# Patient Record
Sex: MALE | Race: WHITE | NOT HISPANIC OR LATINO | Employment: UNEMPLOYED | URBAN - METROPOLITAN AREA
[De-identification: names, ages, dates, MRNs, and addresses within clinical notes are randomized per-mention and may not be internally consistent; named-entity substitution may affect disease eponyms.]

---

## 2023-06-12 ENCOUNTER — OFFICE VISIT (OUTPATIENT)
Dept: PEDIATRICS | Facility: CLINIC | Age: 18
End: 2023-06-12
Payer: COMMERCIAL

## 2023-06-12 VITALS — WEIGHT: 128.8 LBS | TEMPERATURE: 98.3 F | BODY MASS INDEX: 21.11 KG/M2

## 2023-06-12 DIAGNOSIS — B34.9 VIRAL SYNDROME: Primary | ICD-10-CM

## 2023-06-12 PROBLEM — R10.31 ACUTE RIGHT LOWER QUADRANT PAIN: Status: RESOLVED | Noted: 2023-06-12 | Resolved: 2023-06-12

## 2023-06-12 PROBLEM — K52.9 GASTROENTERITIS: Status: RESOLVED | Noted: 2023-06-12 | Resolved: 2023-06-12

## 2023-06-12 PROBLEM — U07.1 COVID-19: Status: RESOLVED | Noted: 2023-06-12 | Resolved: 2023-06-12

## 2023-06-12 PROBLEM — B08.4 HAND, FOOT AND MOUTH DISEASE: Status: RESOLVED | Noted: 2023-06-12 | Resolved: 2023-06-12

## 2023-06-12 PROBLEM — A09 TRAVELERS' DIARRHEA: Status: RESOLVED | Noted: 2023-06-12 | Resolved: 2023-06-12

## 2023-06-12 PROBLEM — L01.00 IMPETIGO: Status: RESOLVED | Noted: 2023-06-12 | Resolved: 2023-06-12

## 2023-06-12 PROCEDURE — 99213 OFFICE O/P EST LOW 20 MIN: CPT | Performed by: PEDIATRICS

## 2023-06-12 NOTE — PROGRESS NOTES
Ángel Lopesalkashmir is a 17 y.o. who presents for Nasal Congestion, Chest Pain, Headache, and Generalized Body Aches.      HPI  Ángel has had over a week of rash, intermittent aches, and chills. He has had headache intermittently.  He has not had significant nasal congestion, no facial pressure.  Today he still has some mild sore ness, but he is generally improved.  He is taking Ibuprofen 2 to 4 times per day.  He is leaving for trip to [facility] tomorrow evening.  Objective   Temp 36.8 °C (98.3 °F) (Oral)   Wt 58.4 kg (128 lb 12.8 oz)   BMI 21.11 kg/m²     Physical Exam  Constitutional:       Appearance: Normal appearance.   HENT:      Right Ear: Tympanic membrane normal.      Left Ear: Tympanic membrane normal.      Nose: Nose normal.      Mouth/Throat:      Mouth: Mucous membranes are moist.   Eyes:      Conjunctiva/sclera: Conjunctivae normal.   Neck:      Comments: Neck is supple -- there is some mild pain in the posterior/lateral neck muscle insertions with flexion, otherwise normal.  Cardiovascular:      Rate and Rhythm: Normal rate and regular rhythm.      Heart sounds: Normal heart sounds.   Pulmonary:      Effort: Pulmonary effort is normal.      Breath sounds: Normal breath sounds.   Abdominal:      General: Bowel sounds are normal.      Tenderness: There is no abdominal tenderness.      Comments: No splenomegaly.  Liver palpable about 1 cm BCM   Musculoskeletal:      Cervical back: Normal range of motion and neck supple.   Neurological:      Mental Status: He is alert.         Assessment/Plan   Ángel's symptoms are most consistent with a viral etiology without signs of complications.  In general he is improving today.  We discussed options including a lab evaluation vs. Observation (especially given his trip to [facility] tomorrow) and elected to observe with symptomatic treatment.      Today we discussed a typical course of illness, symptomatic treatment, and signs of worsening/when to seek medical care.

## 2023-06-24 ENCOUNTER — TELEPHONE (OUTPATIENT)
Dept: PEDIATRICS | Facility: CLINIC | Age: 18
End: 2023-06-24
Payer: COMMERCIAL

## 2023-06-24 NOTE — TELEPHONE ENCOUNTER
Ángel called. He has an appt Monday with AN for ongoing body aches and general malaise. He woke up last night with severe body aches. No fever or other symptoms. No rashes. He took some Motrin and fell back to sleep. Feels better this am but still achy. Home care advised.

## 2023-06-26 ENCOUNTER — OFFICE VISIT (OUTPATIENT)
Dept: PEDIATRICS | Facility: CLINIC | Age: 18
End: 2023-06-26
Payer: COMMERCIAL

## 2023-06-26 ENCOUNTER — TELEPHONE (OUTPATIENT)
Dept: PEDIATRICS | Facility: CLINIC | Age: 18
End: 2023-06-26

## 2023-06-26 VITALS
SYSTOLIC BLOOD PRESSURE: 107 MMHG | TEMPERATURE: 99.3 F | DIASTOLIC BLOOD PRESSURE: 55 MMHG | BODY MASS INDEX: 21.66 KG/M2 | WEIGHT: 132.19 LBS | HEART RATE: 108 BPM

## 2023-06-26 DIAGNOSIS — M25.50 ARTHRALGIA, UNSPECIFIED JOINT: ICD-10-CM

## 2023-06-26 DIAGNOSIS — R50.9 FEVER, UNSPECIFIED FEVER CAUSE: Primary | ICD-10-CM

## 2023-06-26 PROCEDURE — 99215 OFFICE O/P EST HI 40 MIN: CPT | Performed by: PEDIATRICS

## 2023-06-26 RX ORDER — CIPROFLOXACIN HYDROCHLORIDE 500 MG/1
TABLET, FILM COATED ORAL 2 TIMES DAILY
COMMUNITY
Start: 2023-06-25 | End: 2023-06-29

## 2023-06-26 NOTE — TELEPHONE ENCOUNTER
I called and spoke with mom with Ángel in the same room as well.  About 1 month ago Ángel had a non-specific illness with cough, hives, rash, and neck stiffness -- this occurred while at college.  This improved, although I saw him 3 weeks ago and since his symptoms were improving, no further evaluation was done at that time.  He continued to have a mild cough, but was otherwise well and spent a week in [place].  Three days ago he developed pain in his neck, shoulders, and back and yesterday developed fever.  He had severe pain and high fever and was taken to [facility] ER.  There, he underwent a sepsis eval -- cbc, chemistries, lactate, Troponin, CXR, and head CT were negative except a minimal increase in his wbc count with a left shift.  Blood cultures are negative.  Repeat HIV testing was negative. He was given IV antibiotics and oral cipro.  Overnight he has remained febrile and has continued pain, although it is now improved with Ibuprofen.  He may have some ankle swelling. He is coming to see me this afternoon.  We discussed indications to return to the ER, otherwise we will re-evaluate this afternoon.

## 2023-06-26 NOTE — TELEPHONE ENCOUNTER
Mom calling- 3 weeks ago he was at school and had a rash with modeled hands, painful neck, fever, came home and was seen here.  Liver was enlarged, thought he was getting over a virus, went to [place]. Came home and Friday night started feeling very bad again, back, shoulders and neck were very painful.  Delirious when he woke up in the middle of the night Friday.  Progressively worsened high fever yesterday and went to ER, the called a sepsis alert in the ER (notes from [facility]).  Discharged home with antibiotics.  This morning he still has a fever and pain seems to be moving all around body.  He is not any better.  Please advise.     [phone]

## 2023-06-27 ENCOUNTER — TELEPHONE (OUTPATIENT)
Dept: PEDIATRICS | Facility: CLINIC | Age: 18
End: 2023-06-27
Payer: COMMERCIAL

## 2023-06-27 ENCOUNTER — LAB (OUTPATIENT)
Dept: LAB | Facility: LAB | Age: 18
End: 2023-06-27
Payer: COMMERCIAL

## 2023-06-27 DIAGNOSIS — R50.9 FEVER, UNSPECIFIED FEVER CAUSE: ICD-10-CM

## 2023-06-27 PROCEDURE — 80053 COMPREHEN METABOLIC PANEL: CPT

## 2023-06-27 PROCEDURE — 85025 COMPLETE CBC W/AUTO DIFF WBC: CPT

## 2023-06-27 PROCEDURE — 86140 C-REACTIVE PROTEIN: CPT

## 2023-06-27 PROCEDURE — 85652 RBC SED RATE AUTOMATED: CPT

## 2023-06-27 PROCEDURE — 36415 COLL VENOUS BLD VENIPUNCTURE: CPT

## 2023-06-27 NOTE — TELEPHONE ENCOUNTER
Mom calling- he has a new symptom, feeling worse, its been 1.5 hrs since his Advil, still has 101.5 fever.  And in the back of his head he feels like someone is stabbing him with a pin. Mom wanted you to know.  Advised mom that if pain becomes intolerable to go to ER.  Coming in for blood work today.

## 2023-06-27 NOTE — TELEPHONE ENCOUNTER
Called and spoke with mom -- will repeat labs today and follow up with me tomorrow.  Continue regular Ibuprofen.  To ER if worse.  Consider ID and/or rheumatology input this week if symptoms persist.

## 2023-06-28 ENCOUNTER — OFFICE VISIT (OUTPATIENT)
Dept: PEDIATRICS | Facility: CLINIC | Age: 18
End: 2023-06-28
Payer: COMMERCIAL

## 2023-06-28 VITALS
SYSTOLIC BLOOD PRESSURE: 115 MMHG | TEMPERATURE: 97 F | DIASTOLIC BLOOD PRESSURE: 70 MMHG | HEART RATE: 72 BPM | WEIGHT: 133 LBS | BODY MASS INDEX: 21.8 KG/M2

## 2023-06-28 DIAGNOSIS — M02.39 REACTIVE ARTHRITIS OF MULTIPLE SITES (MULTI): ICD-10-CM

## 2023-06-28 DIAGNOSIS — R50.9 FEVER, UNSPECIFIED FEVER CAUSE: Primary | ICD-10-CM

## 2023-06-28 DIAGNOSIS — R19.7 DIARRHEA OF PRESUMED INFECTIOUS ORIGIN: ICD-10-CM

## 2023-06-28 LAB
ALANINE AMINOTRANSFERASE (SGPT) (U/L) IN SER/PLAS: 11 U/L (ref 10–52)
ALBUMIN (G/DL) IN SER/PLAS: 3.6 G/DL (ref 3.4–5)
ALKALINE PHOSPHATASE (U/L) IN SER/PLAS: 101 U/L (ref 33–120)
ANION GAP IN SER/PLAS: 13 MMOL/L (ref 10–20)
ASPARTATE AMINOTRANSFERASE (SGOT) (U/L) IN SER/PLAS: 14 U/L (ref 9–39)
BASOPHILS (10*3/UL) IN BLOOD BY AUTOMATED COUNT: 0.04 X10E9/L (ref 0–0.1)
BASOPHILS/100 LEUKOCYTES IN BLOOD BY AUTOMATED COUNT: 0.3 % (ref 0–2)
BILIRUBIN TOTAL (MG/DL) IN SER/PLAS: 0.4 MG/DL (ref 0–1.2)
C REACTIVE PROTEIN (MG/L) IN SER/PLAS: 23.33 MG/DL
CALCIUM (MG/DL) IN SER/PLAS: 9.4 MG/DL (ref 8.6–10.6)
CARBON DIOXIDE, TOTAL (MMOL/L) IN SER/PLAS: 28 MMOL/L (ref 21–32)
CHLORIDE (MMOL/L) IN SER/PLAS: 103 MMOL/L (ref 98–107)
CREATININE (MG/DL) IN SER/PLAS: 0.92 MG/DL (ref 0.5–1.3)
EOSINOPHILS (10*3/UL) IN BLOOD BY AUTOMATED COUNT: 0.75 X10E9/L (ref 0–0.7)
EOSINOPHILS/100 LEUKOCYTES IN BLOOD BY AUTOMATED COUNT: 5.2 % (ref 0–6)
ERYTHROCYTE DISTRIBUTION WIDTH (RATIO) BY AUTOMATED COUNT: 12 % (ref 11.5–14.5)
ERYTHROCYTE MEAN CORPUSCULAR HEMOGLOBIN CONCENTRATION (G/DL) BY AUTOMATED: 32.5 G/DL (ref 32–36)
ERYTHROCYTE MEAN CORPUSCULAR VOLUME (FL) BY AUTOMATED COUNT: 94 FL (ref 80–100)
ERYTHROCYTES (10*6/UL) IN BLOOD BY AUTOMATED COUNT: 4.12 X10E12/L (ref 4.5–5.9)
GFR MALE: >90 ML/MIN/1.73M2
GLUCOSE (MG/DL) IN SER/PLAS: 91 MG/DL (ref 74–99)
HEMATOCRIT (%) IN BLOOD BY AUTOMATED COUNT: 38.8 % (ref 41–52)
HEMOGLOBIN (G/DL) IN BLOOD: 12.6 G/DL (ref 13.5–17.5)
IMMATURE GRANULOCYTES/100 LEUKOCYTES IN BLOOD BY AUTOMATED COUNT: 0.3 % (ref 0–0.9)
LEUKOCYTES (10*3/UL) IN BLOOD BY AUTOMATED COUNT: 14.5 X10E9/L (ref 4.4–11.3)
LYMPHOCYTES (10*3/UL) IN BLOOD BY AUTOMATED COUNT: 1.23 X10E9/L (ref 1.2–4.8)
LYMPHOCYTES/100 LEUKOCYTES IN BLOOD BY AUTOMATED COUNT: 8.5 % (ref 13–44)
MONOCYTES (10*3/UL) IN BLOOD BY AUTOMATED COUNT: 1.2 X10E9/L (ref 0.1–1)
MONOCYTES/100 LEUKOCYTES IN BLOOD BY AUTOMATED COUNT: 8.3 % (ref 2–10)
NEUTROPHILS (10*3/UL) IN BLOOD BY AUTOMATED COUNT: 11.25 X10E9/L (ref 1.2–7.7)
NEUTROPHILS/100 LEUKOCYTES IN BLOOD BY AUTOMATED COUNT: 77.4 % (ref 40–80)
NRBC (PER 100 WBCS) BY AUTOMATED COUNT: 0 /100 WBC (ref 0–0)
PLATELETS (10*3/UL) IN BLOOD AUTOMATED COUNT: 358 X10E9/L (ref 150–450)
POTASSIUM (MMOL/L) IN SER/PLAS: 4.3 MMOL/L (ref 3.5–5.3)
PROTEIN TOTAL: 6.9 G/DL (ref 6.4–8.2)
SEDIMENTATION RATE, ERYTHROCYTE: 69 MM/H (ref 0–15)
SODIUM (MMOL/L) IN SER/PLAS: 140 MMOL/L (ref 136–145)
UREA NITROGEN (MG/DL) IN SER/PLAS: 12 MG/DL (ref 6–23)

## 2023-06-28 PROCEDURE — 99215 OFFICE O/P EST HI 40 MIN: CPT | Performed by: PEDIATRICS

## 2023-06-28 RX ORDER — NAPROXEN 500 MG/1
500 TABLET ORAL 2 TIMES DAILY
Qty: 20 TABLET | Refills: 1 | Status: SHIPPED | OUTPATIENT
Start: 2023-06-28 | End: 2023-07-08

## 2023-06-28 NOTE — PROGRESS NOTES
Ángel Lopesalkashmir is a 17 y.o. who presents for Joint Swelling.  Today he is accompanied by mother who provided history.    HPI  He is here today for follow up of his acute febrile illness with arthralgias and arthritis.  He has now had 5 days of symptoms. Yesterday he had repeat labs that showed very significant elevations of his CRP and ESR and well as mild anemia without other lab abnormalities of significance.  He has continued to have pain, although it is improved.  He has now had diarrhea 4 to 5 times per day.  No blood in the stool.  He is still on cipro.    Ángel is sexually active with one male partner, although last sexual encounter was a month ago.  HIV testing was negative at the ER several days ago.    Objective   Temp 36.1 °C (97 °F) (Oral)   Wt 60.3 kg (133 lb)   BMI 21.80 kg/m²     Physical Exam  Constitutional:       Appearance: Normal appearance.   HENT:      Right Ear: Tympanic membrane normal.      Left Ear: Tympanic membrane normal.      Nose: Nose normal.      Mouth/Throat:      Mouth: Mucous membranes are moist.   Eyes:      Conjunctiva/sclera: Conjunctivae normal.   Cardiovascular:      Rate and Rhythm: Normal rate and regular rhythm.      Heart sounds: Normal heart sounds.   Pulmonary:      Effort: Pulmonary effort is normal.      Breath sounds: Normal breath sounds.   Abdominal:      General: Bowel sounds are normal.      Tenderness: There is no abdominal tenderness.   Musculoskeletal:      Cervical back: Normal range of motion and neck supple.   Neurological:      Mental Status: He is alert.         Assessment/Plan   Ángel's acute illness is most likely a reactive arthritis, although the triggering etiology is not yet identified.  He clearly does not have concerns for meningococcus, so Cipro was stopped.  He will change to Naproxen 500mg bid for the next 7 days.  Repeat labs were ordered tomorrow, including some studies to look for etiologies including:  Stool pcr  Mycoplasma, Parvo, EBV, CMV,  GC/Chlamydia in urine.  Stop richie Neal or his mother will call sooner with any concerns, otherwise we will plan phone follow up when results are available.

## 2023-07-03 ENCOUNTER — OFFICE VISIT (OUTPATIENT)
Dept: PEDIATRICS | Facility: CLINIC | Age: 18
End: 2023-07-03
Payer: COMMERCIAL

## 2023-07-03 ENCOUNTER — PATIENT OUTREACH (OUTPATIENT)
Dept: CARE COORDINATION | Facility: CLINIC | Age: 18
End: 2023-07-03

## 2023-07-03 VITALS — WEIGHT: 127.06 LBS | BODY MASS INDEX: 20.82 KG/M2 | TEMPERATURE: 98.8 F

## 2023-07-03 DIAGNOSIS — R50.9 FEVER, UNSPECIFIED FEVER CAUSE: Primary | ICD-10-CM

## 2023-07-03 DIAGNOSIS — M19.90 ARTHRITIS: ICD-10-CM

## 2023-07-03 PROCEDURE — 99215 OFFICE O/P EST HI 40 MIN: CPT | Performed by: PEDIATRICS

## 2023-07-03 SDOH — SOCIAL STABILITY: SOCIAL NETWORK: ARE YOU MARRIED, WIDOWED, DIVORCED, SEPARATED, NEVER MARRIED, OR LIVING WITH A PARTNER?: NEVER MARRIED

## 2023-07-03 SDOH — ECONOMIC STABILITY: TRANSPORTATION INSECURITY
IN THE PAST 12 MONTHS, HAS THE LACK OF TRANSPORTATION KEPT YOU FROM MEDICAL APPOINTMENTS OR FROM GETTING MEDICATIONS?: NO

## 2023-07-03 SDOH — ECONOMIC STABILITY: GENERAL: WOULD YOU LIKE HELP WITH ANY OF THE FOLLOWING NEEDS?: I DONT NEED HELP WITH ANY OF THESE

## 2023-07-03 NOTE — PROGRESS NOTES
Directed to [facility] from pediatrician office for fever and elevated CRP, admitted 6/28/23 to 6/30/23.  Recent travel to [place].   Unknown etiology of fever.   Followed up with pediatrician today with labs pending.      Engagement  Call Start Time: 1213 (7/3/2023 12:12 PM)    Medications  Medications reviewed with patient/caregiver?: No (7/3/2023 12:12 PM)  Care Management Interventions: No intervention needed (7/3/2023 12:12 PM)    Appointments  Does the patient have a primary care provider?: Yes (Saw pediatrician 7/3) (7/3/2023 12:12 PM)  Has the patient kept scheduled appointments due by today?: Yes (7/3/2023 12:12 PM)    Patient Teaching  What is the patient's perception of their health status since discharge?: Improving (7/3/2023 12:12 PM)    Wrap Up  Call End Time: 1214 (7/3/2023 12:12 PM)        Outreach to patient for completion of transition of care.  Enrolled in [place] chat for 30 transition period and will outreach if issues arise.    Aury FRANCOIS RN CCM  RN Care Coordinator  [facility]  Phone [phone]

## 2023-07-03 NOTE — PROGRESS NOTES
Ángel Barkley is a 17 y.o. who presents for Follow-up.  Today he is accompanied by mother who provided history.    ALANNAH Neal is here for a hospital follow up.  He was admitted to [facility] 5 days ago and stayed for 2 days for evaluation of fever, arthralgias, and arthritis of uncertain etiology.  He was tested for multiple different causes and no definitive diagnosis was made.  He was seen by ID, GI, and oncology while there.  Lyme, Brucellosis, and fecal calprotectin were still pending at discharge.    He has been afebrile for the last 2 days and his pain is still significant, but improved from last week.  He continues on alternating Ibuprofen and Acetaminophen.  He is eating and drinking fairly well, although he has lost 5lbs during this illness.    He has no other new symptoms.    Ángel's hospital records were extensively reviewed including results of all lab tests.    He is scheduled to follow up for an endoscopy with GI in the coming weeks as well as ID.  Oncology requested repeat labs in 4 to 6 weeks.      Objective   Temp 37.1 °C (98.8 °F)   Wt 57.6 kg (127 lb 1 oz)   BMI 20.82 kg/m²     Physical Exam  Constitutional:       Appearance: Normal appearance.   HENT:      Right Ear: Tympanic membrane normal.      Left Ear: Tympanic membrane normal.      Nose: Nose normal.      Mouth/Throat:      Mouth: Mucous membranes are moist.   Eyes:      Conjunctiva/sclera: Conjunctivae normal.   Cardiovascular:      Rate and Rhythm: Normal rate and regular rhythm.      Heart sounds: Normal heart sounds.   Pulmonary:      Effort: Pulmonary effort is normal.      Breath sounds: Normal breath sounds.   Abdominal:      General: Bowel sounds are normal.      Tenderness: There is no abdominal tenderness.   Musculoskeletal:      Cervical back: Normal range of motion and neck supple.   Lymphadenopathy:      Comments: No cervical, axillary, supraclavicular adenopathy noted   Neurological:      Mental Status: He is alert.          Assessment/Plan   Ángel has fever and arthralgias/arthritis of uncertain etiology after an extensive evaluation including inpatient hospital admission.  All of his labs were reviewed.  Plan:    Continue Ibuprofen/Acetaminophen  Follow up with Rheum/ID this week  I will contact Dr. Pleitez from ID to discuss other etiologies/labs to consider  Call me at any time if worse.

## 2023-07-06 DIAGNOSIS — R50.9 FEVER, UNSPECIFIED FEVER CAUSE: Primary | ICD-10-CM

## 2023-07-06 NOTE — PROGRESS NOTES
Called and spoke with mom -- Ángel is now afebrile, but continues to have significant generalized pain that is improved with Ibuprofen.  He was seen by Dr. Pleitez from ID and Dr. Villa from rheumatology -- multiple labs were ordered looking at rheum causes and his inflammatory markers will be repeated.  I added Bartonella and Parvovirus titers as well.  I agreed with Ángel seeing Dr. Luong for immunology as well.

## 2023-07-10 ENCOUNTER — APPOINTMENT (OUTPATIENT)
Dept: LAB | Facility: LAB | Age: 18
End: 2023-07-10
Payer: COMMERCIAL

## 2023-07-10 LAB
ALANINE AMINOTRANSFERASE (SGPT) (U/L) IN SER/PLAS: 32 U/L (ref 10–52)
ALBUMIN (G/DL) IN SER/PLAS: 4 G/DL (ref 3.4–5)
ALKALINE PHOSPHATASE (U/L) IN SER/PLAS: 95 U/L (ref 33–120)
ANION GAP IN SER/PLAS: 15 MMOL/L (ref 10–20)
APPEARANCE, URINE: CLEAR
ASPARTATE AMINOTRANSFERASE (SGOT) (U/L) IN SER/PLAS: 17 U/L (ref 9–39)
BASOPHILS (10*3/UL) IN BLOOD BY AUTOMATED COUNT: 0.05 X10E9/L (ref 0–0.1)
BASOPHILS (10*3/UL) IN BLOOD BY AUTOMATED COUNT: NORMAL
BASOPHILS (10*3/UL) IN BLOOD BY AUTOMATED COUNT: NORMAL
BASOPHILS/100 LEUKOCYTES IN BLOOD BY AUTOMATED COUNT: 0.4 % (ref 0–2)
BASOPHILS/100 LEUKOCYTES IN BLOOD BY AUTOMATED COUNT: NORMAL
BASOPHILS/100 LEUKOCYTES IN BLOOD BY AUTOMATED COUNT: NORMAL
BILIRUBIN TOTAL (MG/DL) IN SER/PLAS: 0.4 MG/DL (ref 0–1.2)
BILIRUBIN, URINE: NEGATIVE
BLOOD, URINE: NEGATIVE
C REACTIVE PROTEIN (MG/L) IN SER/PLAS: 12.15 MG/DL
CALCIUM (MG/DL) IN RANDOM URINE: 6.9 MG/DL
CALCIUM (MG/DL) IN SER/PLAS: 10 MG/DL (ref 8.6–10.6)
CALCIUM/CREATINE (MG/G) IN URINE: 141 MG/G CREAT (ref 0–209)
CARBON DIOXIDE, TOTAL (MMOL/L) IN SER/PLAS: 30 MMOL/L (ref 21–32)
CHLORIDE (MMOL/L) IN SER/PLAS: 99 MMOL/L (ref 98–107)
CITRULLINE ANTIBODY, IGG: <1 U/ML
COLOR, URINE: NORMAL
COMPLEMENT C3 (MG/DL) IN SER/PLAS: 185 MG/DL (ref 87–200)
COMPLEMENT C4 (MG/DL) IN SER/PLAS: 43 MG/DL (ref 10–50)
CREATINE KINASE (U/L) IN SER/PLAS: 15 U/L (ref 0–325)
CREATININE (MG/DL) IN SER/PLAS: 0.92 MG/DL (ref 0.5–1.3)
CREATININE (MG/DL) IN URINE: 48.9 MG/DL (ref 20–370)
CREATININE (MG/DL) IN URINE: 48.9 MG/DL (ref 20–370)
EOSINOPHILS (10*3/UL) IN BLOOD BY AUTOMATED COUNT: 0.33 X10E9/L (ref 0–0.7)
EOSINOPHILS (10*3/UL) IN BLOOD BY AUTOMATED COUNT: NORMAL
EOSINOPHILS (10*3/UL) IN BLOOD BY AUTOMATED COUNT: NORMAL
EOSINOPHILS/100 LEUKOCYTES IN BLOOD BY AUTOMATED COUNT: 2.5 % (ref 0–6)
EOSINOPHILS/100 LEUKOCYTES IN BLOOD BY AUTOMATED COUNT: NORMAL
EOSINOPHILS/100 LEUKOCYTES IN BLOOD BY AUTOMATED COUNT: NORMAL
ERYTHROCYTE DISTRIBUTION WIDTH (RATIO) BY AUTOMATED COUNT: 11.9 % (ref 11.5–14.5)
ERYTHROCYTE DISTRIBUTION WIDTH (RATIO) BY AUTOMATED COUNT: NORMAL
ERYTHROCYTE DISTRIBUTION WIDTH (RATIO) BY AUTOMATED COUNT: NORMAL
ERYTHROCYTE MEAN CORPUSCULAR HEMOGLOBIN CONCENTRATION (G/DL) BY AUTOMATED: 32.9 G/DL (ref 32–36)
ERYTHROCYTE MEAN CORPUSCULAR HEMOGLOBIN CONCENTRATION (G/DL) BY AUTOMATED: NORMAL
ERYTHROCYTE MEAN CORPUSCULAR HEMOGLOBIN CONCENTRATION (G/DL) BY AUTOMATED: NORMAL
ERYTHROCYTE MEAN CORPUSCULAR VOLUME (FL) BY AUTOMATED COUNT: 91 FL (ref 80–100)
ERYTHROCYTE MEAN CORPUSCULAR VOLUME (FL) BY AUTOMATED COUNT: NORMAL
ERYTHROCYTE MEAN CORPUSCULAR VOLUME (FL) BY AUTOMATED COUNT: NORMAL
ERYTHROCYTES (10*6/UL) IN BLOOD BY AUTOMATED COUNT: 4.7 X10E12/L (ref 4.5–5.9)
ERYTHROCYTES (10*6/UL) IN BLOOD BY AUTOMATED COUNT: NORMAL
ERYTHROCYTES (10*6/UL) IN BLOOD BY AUTOMATED COUNT: NORMAL
FMETH: NORMAL
FMETH: NORMAL
FSIT1: NORMAL
FSIT1: NORMAL
GFR MALE: >90 ML/MIN/1.73M2
GLUCOSE (MG/DL) IN SER/PLAS: 95 MG/DL (ref 74–99)
GLUCOSE, URINE: NEGATIVE MG/DL
HEMATOCRIT (%) IN BLOOD BY AUTOMATED COUNT: 42.8 % (ref 41–52)
HEMATOCRIT (%) IN BLOOD BY AUTOMATED COUNT: NORMAL
HEMATOCRIT (%) IN BLOOD BY AUTOMATED COUNT: NORMAL
HEMOGLOBIN (G/DL) IN BLOOD: 14.1 G/DL (ref 13.5–17.5)
HEMOGLOBIN (G/DL) IN BLOOD: NORMAL
HEMOGLOBIN (G/DL) IN BLOOD: NORMAL
IGA (MG/DL) IN SER/PLAS: 171 MG/DL (ref 70–400)
IGE (IU/L) IN SERUM OR PLASMA: 12 IU/ML (ref 0–214)
IGG (MG/DL) IN SER/PLAS: 1590 MG/DL (ref 700–1600)
IGM (MG/DL) IN SER/PLAS: 213 MG/DL (ref 40–230)
IMMATURE GRANULOCYTES/100 LEUKOCYTES IN BLOOD BY AUTOMATED COUNT: 0.8 % (ref 0–0.9)
IMMATURE GRANULOCYTES/100 LEUKOCYTES IN BLOOD BY AUTOMATED COUNT: NORMAL
IMMATURE GRANULOCYTES/100 LEUKOCYTES IN BLOOD BY AUTOMATED COUNT: NORMAL
KETONES, URINE: NEGATIVE MG/DL
LEUKOCYTE ESTERASE, URINE: NEGATIVE
LEUKOCYTES (10*3/UL) IN BLOOD BY AUTOMATED COUNT: 13.4 X10E9/L (ref 4.4–11.3)
LEUKOCYTES (10*3/UL) IN BLOOD BY AUTOMATED COUNT: NORMAL
LEUKOCYTES (10*3/UL) IN BLOOD BY AUTOMATED COUNT: NORMAL
LYMPHOCYTES (10*3/UL) IN BLOOD BY AUTOMATED COUNT: 1.63 X10E9/L (ref 1.2–4.8)
LYMPHOCYTES (10*3/UL) IN BLOOD BY AUTOMATED COUNT: NORMAL
LYMPHOCYTES (10*3/UL) IN BLOOD BY AUTOMATED COUNT: NORMAL
LYMPHOCYTES/100 LEUKOCYTES IN BLOOD BY AUTOMATED COUNT: 12.2 % (ref 13–44)
LYMPHOCYTES/100 LEUKOCYTES IN BLOOD BY AUTOMATED COUNT: NORMAL
LYMPHOCYTES/100 LEUKOCYTES IN BLOOD BY AUTOMATED COUNT: NORMAL
MANUAL DIFFERENTIAL Y/N: NORMAL
MANUAL DIFFERENTIAL Y/N: NORMAL
MARKER INTERPRETATION: NORMAL
MARKER INTERPRETATION: NORMAL
MONOCYTES (10*3/UL) IN BLOOD BY AUTOMATED COUNT: 0.82 X10E9/L (ref 0.1–1)
MONOCYTES (10*3/UL) IN BLOOD BY AUTOMATED COUNT: NORMAL
MONOCYTES (10*3/UL) IN BLOOD BY AUTOMATED COUNT: NORMAL
MONOCYTES/100 LEUKOCYTES IN BLOOD BY AUTOMATED COUNT: 6.1 % (ref 2–10)
MONOCYTES/100 LEUKOCYTES IN BLOOD BY AUTOMATED COUNT: NORMAL
MONOCYTES/100 LEUKOCYTES IN BLOOD BY AUTOMATED COUNT: NORMAL
NEUTROPHILS (10*3/UL) IN BLOOD BY AUTOMATED COUNT: 10.47 X10E9/L (ref 1.2–7.7)
NEUTROPHILS (10*3/UL) IN BLOOD BY AUTOMATED COUNT: NORMAL
NEUTROPHILS (10*3/UL) IN BLOOD BY AUTOMATED COUNT: NORMAL
NEUTROPHILS/100 LEUKOCYTES IN BLOOD BY AUTOMATED COUNT: 78 % (ref 40–80)
NEUTROPHILS/100 LEUKOCYTES IN BLOOD BY AUTOMATED COUNT: NORMAL
NEUTROPHILS/100 LEUKOCYTES IN BLOOD BY AUTOMATED COUNT: NORMAL
NITRITE, URINE: NEGATIVE
NRBC (PER 100 WBCS) BY AUTOMATED COUNT: 0 /100 WBC (ref 0–0)
NRBC (PER 100 WBCS) BY AUTOMATED COUNT: NORMAL
NRBC (PER 100 WBCS) BY AUTOMATED COUNT: NORMAL
PH, URINE: 6 (ref 5–8)
PLATELETS (10*3/UL) IN BLOOD AUTOMATED COUNT: 565 X10E9/L (ref 150–450)
PLATELETS (10*3/UL) IN BLOOD AUTOMATED COUNT: NORMAL
PLATELETS (10*3/UL) IN BLOOD AUTOMATED COUNT: NORMAL
POTASSIUM (MMOL/L) IN SER/PLAS: 4.7 MMOL/L (ref 3.5–5.3)
PROTEIN (MG/DL) IN URINE: <4 MG/DL (ref 5–25)
PROTEIN TOTAL: 7.8 G/DL (ref 6.4–8.2)
PROTEIN, URINE: NEGATIVE MG/DL
PROTEIN/CREATININE (MG/MG) IN URINE: ABNORMAL MG/MG CREAT (ref 0–0.17)
RHEUMATOID FACTOR (IU/ML) IN SERUM OR PLASMA: <10 IU/ML (ref 0–15)
RUBEOLA IGG ANTIBODY: POSITIVE
SEDIMENTATION RATE, ERYTHROCYTE: 73 MM/H (ref 0–15)
SODIUM (MMOL/L) IN SER/PLAS: 139 MMOL/L (ref 136–145)
SPECIFIC GRAVITY, URINE: 1.01 (ref 1–1.03)
UREA NITROGEN (MG/DL) IN SER/PLAS: 15 MG/DL (ref 6–23)
UROBILINOGEN, URINE: <2 MG/DL (ref 0–1.9)
VON WILLEBRAND AG ACTUAL/NORMAL IN PPP: 139 % (ref 50–220)

## 2023-07-11 LAB
ANA PATTERN: ABNORMAL
ANA TITER: ABNORMAL
ANTI-NUCLEAR ANTIBODY (ANA): POSITIVE
EBV PCR PLASMA LOG IU/ML: NORMAL LOG IU/ML
EBV PCR WHOLE BLD LOG IU/ML: NORMAL LOG IU/ML
EBV PCR, QUANT, PLASMA: NOT DETECTED IU/ML
EBV PCR, QUANT, WHOLE BLOOD: NOT DETECTED IU/ML

## 2023-07-12 LAB
ANTI-CENTROMERE: <0.2 AI
ANTI-CHROMATIN: 0.2 AI
ANTI-DNA (DS): 1 IU/ML
ANTI-JO-1 IGG: <0.2 AI
ANTI-RIBOSOMAL P: <0.2 AI
ANTI-RNP: 0.3 AI
ANTI-SCL-70: <0.2 AI
ANTI-SM/RNP: <0.2 AI
ANTI-SM: <0.2 AI
ANTI-SSA: <0.2 AI
ANTI-SSB: <0.2 AI
CD19 ABSOLUTE: 0.21 X10E9/L (ref 0.07–0.91)
CD19 ABSOLUTE: 0.23 X10E9/L (ref 0.07–0.91)
CD19%: 13 % (ref 6–19)
CD19%: 14 % (ref 6–19)
CD19+CD24++CD38++%: 10.5 % (ref 3–5.9)
CD19+CD24-CD38++%: 2.3 % (ref 0.6–1.6)
CD19+CD27+IGD+%: 15 % (ref 7.4–13.9)
CD19+CD27+IGD-%: 11.6 % (ref 7.2–12.7)
CD19+CD27-IGD+%: 69.9 % (ref 65.6–79.6)
CD3 ABSOLUTE: 1.29 X10E9/L (ref 0.71–4.18)
CD3 ABSOLUTE: 1.3 X10E9/L (ref 0.71–4.18)
CD3%: 79 % (ref 59–87)
CD3%: 80 % (ref 59–87)
CD3+CD4+ ABSOLUTE: 0.7 X10E9/L (ref 0.35–2.74)
CD3+CD4+ ABSOLUTE: 0.7 X10E9/L (ref 0.35–2.74)
CD3+CD4-CD8-%: 13 % (ref 0–6)
CD3+CD45RA+CD45RO-%: 53.5 % (ref 12.1–50.7)
CD3+CD45RO+CD45RA-%: 42.2 % (ref 24.3–60.8)
CD3+CD8+ ABSOLUTE: 0.38 X10E9/L (ref 0.08–1.49)
CD3+CD8+ ABSOLUTE: 0.38 X10E9/L (ref 0.08–1.49)
CD3-CD16+CD56+ ABSOLUTE: 0.13 X10E9/L (ref 0–0.86)
CD3-CD16+CD56+%: 8 % (ref 0–18)
CD4+CD25+CD127-%: 9.5 % (ref 3.1–9.4)
CD4+CD27+CD45RO+%: 44.1 % (ref 31.7–75.2)
CD4+CD27+CD45RO-%: 52.5 % (ref 11.6–59.9)
CD4+CD27-CD45RO+%: 3.2 % (ref 0.2–15.3)
CD4/CD8 RATIO: 1.87 (ref 1–3.5)
CD4/CD8 RATIO: 1.87 (ref 1–3.5)
CD45%: 100 %
CD8+CD27+CD45RO+%: 43.7 % (ref 16.6–63.4)
CD8+CD27+CD45RO-%: 51.4 % (ref 10.6–59.8)
CD8+CD27-CD45RO+%: 3.7 % (ref 0–19.5)
COMPLEMENT TOTAL (CH50): 47.7 U/ML (ref 38.7–89.9)
CP CD3+CD4+%: 43 % (ref 29–57)
CP CD3+CD4+%: 43 % (ref 29–57)
CP CD3+CD8+%: 23 % (ref 7–31)
CP CD3+CD8+%: 23 % (ref 7–31)
FMETH: ABNORMAL
FSIT1: ABNORMAL
INTERLEUKIN 1 BETA: <6.5 PG/ML
INTERLEUKIN 6: 11.1 PG/ML
MARKER INTERPRETATION: ABNORMAL
PV191: NORMAL
TOXOPLASMA GONDII IGG: NONREACTIVE
TUMOR NECROSIS FACTOR ALPHA: 16.3 PG/ML

## 2023-07-13 LAB
ANGIOTENSIN CONVERTING ENZYME: 60 U/L (ref 16–85)
B. BURGDORFERI VLSE1/PEPC10 ABS, ELISA: 0.69 IV
CYTOMEGALOVIRUS IGM ANTIBODY: <8 AU/ML
Lab: 0.7 IV
Lab: NEGATIVE
Lab: NEGATIVE
Lab: NORMAL
PNEUMO SEROTYPE INTERPRETATION: NORMAL
SEROTYPE 1, VIRC: 1 UG/ML
SEROTYPE 10A(34), VIRC: 3.88 UG/ML
SEROTYPE 11A(43), VIRC: 0.49 UG/ML
SEROTYPE 12F, VIRC: 0.2 UG/ML
SEROTYPE 14, VIRC: 1.34 UG/ML
SEROTYPE 15B(54), VIRC: 0.85 UG/ML
SEROTYPE 17F, VIRC: 9 UG/ML
SEROTYPE 18C(56), VIRC: 1.58 UG/ML
SEROTYPE 19A(57), VIRC: 9.13 UG/ML
SEROTYPE 19F, VIRC: 0.89 UG/ML
SEROTYPE 2, VIRC: 0.12 UG/ML
SEROTYPE 20, VIRC: 1 UG/ML
SEROTYPE 22F, VIRC: 0.51 UG/ML
SEROTYPE 23F, VIRC: 1.97 UG/ML
SEROTYPE 3, VIRC: 1.34 UG/ML
SEROTYPE 33F(70), VIRC: 0.11 UG/ML
SEROTYPE 4, VIRC: 0.05 UG/ML
SEROTYPE 5, VIRC: 1.07 UG/ML
SEROTYPE 6B(26), VIRC: 1.42 UG/ML
SEROTYPE 7F(51), VIRC: 1.78 UG/ML
SEROTYPE 8, VIRC: 0.74 UG/ML
SEROTYPE 9N, VIRC: 0.14 UG/ML
SEROTYPE 9V(68), VIRC: 0.11 UG/ML
VITAMIN D 1,25-DIHYDROXY: 29 PG/ML (ref 19.9–79.3)

## 2023-07-14 LAB
ANCA IFA PATTERN: NORMAL
ANCA IFA TITER: NORMAL
BARTONELLA HENSELAE IGG: NORMAL
BARTONELLA HENSELAE IGM: NORMAL
LYSOZYME: 3.87 UG/ML
MYELOPEROXIDASE (MPO) AB, IGG: 0 AU/ML (ref 0–19)
SERINE PROTEINASE 3 (PR3) AB, IGG: 2 AU/ML (ref 0–19)
TETANUS AB IGG: 2.3 IU/ML

## 2023-07-15 LAB
PARVOVIRUS B19 IGG ANTIBODY: 0.2
PARVOVIRUS B19 IGM ANTIBODY: 0.2

## 2023-07-17 LAB
ALANINE AMINOTRANSFERASE (SGPT) (U/L) IN SER/PLAS: 24 U/L (ref 10–52)
ALBUMIN (G/DL) IN SER/PLAS: 3.9 G/DL (ref 3.4–5)
ALKALINE PHOSPHATASE (U/L) IN SER/PLAS: 97 U/L (ref 33–120)
ANION GAP IN SER/PLAS: 12 MMOL/L (ref 10–20)
ASPARTATE AMINOTRANSFERASE (SGOT) (U/L) IN SER/PLAS: 18 U/L (ref 9–39)
BASOPHILS (10*3/UL) IN BLOOD BY AUTOMATED COUNT: 0.04 X10E9/L (ref 0–0.1)
BASOPHILS/100 LEUKOCYTES IN BLOOD BY AUTOMATED COUNT: 0.5 % (ref 0–2)
BILIRUBIN TOTAL (MG/DL) IN SER/PLAS: 0.3 MG/DL (ref 0–1.2)
C REACTIVE PROTEIN (MG/L) IN SER/PLAS: 9.24 MG/DL
CALCIUM (MG/DL) IN SER/PLAS: 9.6 MG/DL (ref 8.6–10.6)
CARBON DIOXIDE, TOTAL (MMOL/L) IN SER/PLAS: 32 MMOL/L (ref 21–32)
CHLORIDE (MMOL/L) IN SER/PLAS: 99 MMOL/L (ref 98–107)
CREATINE KINASE (U/L) IN SER/PLAS: 18 U/L (ref 0–325)
CREATININE (MG/DL) IN SER/PLAS: 0.82 MG/DL (ref 0.5–1.3)
EOSINOPHILS (10*3/UL) IN BLOOD BY AUTOMATED COUNT: 0.31 X10E9/L (ref 0–0.7)
EOSINOPHILS/100 LEUKOCYTES IN BLOOD BY AUTOMATED COUNT: 3.7 % (ref 0–6)
ERYTHROCYTE DISTRIBUTION WIDTH (RATIO) BY AUTOMATED COUNT: 12.2 % (ref 11.5–14.5)
ERYTHROCYTE MEAN CORPUSCULAR HEMOGLOBIN CONCENTRATION (G/DL) BY AUTOMATED: 32.1 G/DL (ref 32–36)
ERYTHROCYTE MEAN CORPUSCULAR VOLUME (FL) BY AUTOMATED COUNT: 92 FL (ref 80–100)
ERYTHROCYTES (10*6/UL) IN BLOOD BY AUTOMATED COUNT: 4.27 X10E12/L (ref 4.5–5.9)
GFR MALE: >90 ML/MIN/1.73M2
GLUCOSE (MG/DL) IN SER/PLAS: 103 MG/DL (ref 74–99)
HEMATOCRIT (%) IN BLOOD BY AUTOMATED COUNT: 39.2 % (ref 41–52)
HEMOGLOBIN (G/DL) IN BLOOD: 12.6 G/DL (ref 13.5–17.5)
IMMATURE GRANULOCYTES/100 LEUKOCYTES IN BLOOD BY AUTOMATED COUNT: 0.2 % (ref 0–0.9)
LACTATE DEHYDROGENASE (U/L) IN SER/PLAS BY LAC->PYR RXN: 93 U/L (ref 84–246)
LEUKOCYTES (10*3/UL) IN BLOOD BY AUTOMATED COUNT: 8.4 X10E9/L (ref 4.4–11.3)
LYMPHOCYTES (10*3/UL) IN BLOOD BY AUTOMATED COUNT: 1.52 X10E9/L (ref 1.2–4.8)
LYMPHOCYTES/100 LEUKOCYTES IN BLOOD BY AUTOMATED COUNT: 18.1 % (ref 13–44)
Lab: 1 U
MONOCYTES (10*3/UL) IN BLOOD BY AUTOMATED COUNT: 0.68 X10E9/L (ref 0.1–1)
MONOCYTES/100 LEUKOCYTES IN BLOOD BY AUTOMATED COUNT: 8.1 % (ref 2–10)
NEUTROPHILS (10*3/UL) IN BLOOD BY AUTOMATED COUNT: 5.84 X10E9/L (ref 1.2–7.7)
NEUTROPHILS/100 LEUKOCYTES IN BLOOD BY AUTOMATED COUNT: 69.4 % (ref 40–80)
NRBC (PER 100 WBCS) BY AUTOMATED COUNT: 0 /100 WBC (ref 0–0)
PLATELETS (10*3/UL) IN BLOOD AUTOMATED COUNT: 508 X10E9/L (ref 150–450)
POTASSIUM (MMOL/L) IN SER/PLAS: 4.4 MMOL/L (ref 3.5–5.3)
PROTEIN TOTAL: 7.3 G/DL (ref 6.4–8.2)
SEDIMENTATION RATE, ERYTHROCYTE: 27 MM/H (ref 0–15)
SODIUM (MMOL/L) IN SER/PLAS: 139 MMOL/L (ref 136–145)
STREPTOLYSIN O AB (IU/ML) IN SER/PLAS: <20 IU/ML (ref 0–200)
THYROTROPIN (MIU/L) IN SER/PLAS BY DETECTION LIMIT <= 0.05 MIU/L: 1.93 MIU/L (ref 0.44–3.98)
URATE (MG/DL) IN SER/PLAS: 6.4 MG/DL (ref 4–7.5)
UREA NITROGEN (MG/DL) IN SER/PLAS: 16 MG/DL (ref 6–23)

## 2023-07-19 ENCOUNTER — DOCUMENTATION (OUTPATIENT)
Dept: CARE COORDINATION | Facility: CLINIC | Age: 18
End: 2023-07-19
Payer: COMMERCIAL

## 2023-07-19 ENCOUNTER — TELEPHONE (OUTPATIENT)
Dept: PEDIATRICS | Facility: CLINIC | Age: 18
End: 2023-07-19
Payer: COMMERCIAL

## 2023-07-19 LAB
DNASE B ANTIBODY: <86 U/ML (ref 0–260)
HLAB27 TYPING: NEGATIVE
MISCELLANEUOUS TEST RESULT: NORMAL
NAME OF SENDOUT TEST: NORMAL
TOXOPLASMA IGM ANTIBODY: <3 AU/ML

## 2023-07-19 NOTE — TELEPHONE ENCOUNTER
Mom called up front asking if we have ever done any sort of testing on his heart.  I do not see anything.  Advised mom that you would look and call if you found anything.

## 2023-07-19 NOTE — PROGRESS NOTES
Readmitted to [facility] 7/18/23 for further work up and coordination of care for persistent inflammation, athralgia, fatigue, and 10# weight loss.  Will outreach patient when discharged.    Aury FRANCOIS RN CCM  RN Care Coordinator  [facility]  Phone [phone]

## 2023-07-20 LAB — Lab: NEGATIVE

## 2023-07-24 ENCOUNTER — PATIENT OUTREACH (OUTPATIENT)
Dept: CARE COORDINATION | Facility: CLINIC | Age: 18
End: 2023-07-24
Payer: COMMERCIAL

## 2023-07-24 LAB
ALANINE AMINOTRANSFERASE (SGPT) (U/L) IN SER/PLAS: 22 U/L (ref 10–52)
ALBUMIN (G/DL) IN SER/PLAS: 3.8 G/DL (ref 3.4–5)
ALKALINE PHOSPHATASE (U/L) IN SER/PLAS: 96 U/L (ref 33–120)
ANION GAP IN SER/PLAS: 13 MMOL/L (ref 10–20)
ASPARTATE AMINOTRANSFERASE (SGOT) (U/L) IN SER/PLAS: 19 U/L (ref 9–39)
BASOPHILS (10*3/UL) IN BLOOD BY AUTOMATED COUNT: 0.01 X10E9/L (ref 0–0.1)
BASOPHILS/100 LEUKOCYTES IN BLOOD BY AUTOMATED COUNT: 0.1 % (ref 0–2)
BILIRUBIN TOTAL (MG/DL) IN SER/PLAS: 0.3 MG/DL (ref 0–1.2)
C REACTIVE PROTEIN (MG/L) IN SER/PLAS: 1.43 MG/DL
CALCIUM (MG/DL) IN SER/PLAS: 9.7 MG/DL (ref 8.6–10.6)
CARBON DIOXIDE, TOTAL (MMOL/L) IN SER/PLAS: 31 MMOL/L (ref 21–32)
CHLORIDE (MMOL/L) IN SER/PLAS: 99 MMOL/L (ref 98–107)
CREATININE (MG/DL) IN SER/PLAS: 0.8 MG/DL (ref 0.5–1.3)
EOSINOPHILS (10*3/UL) IN BLOOD BY AUTOMATED COUNT: 0 X10E9/L (ref 0–0.7)
EOSINOPHILS/100 LEUKOCYTES IN BLOOD BY AUTOMATED COUNT: 0 % (ref 0–6)
ERYTHROCYTE DISTRIBUTION WIDTH (RATIO) BY AUTOMATED COUNT: 12.6 % (ref 11.5–14.5)
ERYTHROCYTE MEAN CORPUSCULAR HEMOGLOBIN CONCENTRATION (G/DL) BY AUTOMATED: 31.3 G/DL (ref 32–36)
ERYTHROCYTE MEAN CORPUSCULAR VOLUME (FL) BY AUTOMATED COUNT: 93 FL (ref 80–100)
ERYTHROCYTES (10*6/UL) IN BLOOD BY AUTOMATED COUNT: 4.38 X10E12/L (ref 4.5–5.9)
GFR MALE: >90 ML/MIN/1.73M2
GLUCOSE (MG/DL) IN SER/PLAS: 97 MG/DL (ref 74–99)
HEMATOCRIT (%) IN BLOOD BY AUTOMATED COUNT: 40.6 % (ref 41–52)
HEMOGLOBIN (G/DL) IN BLOOD: 12.7 G/DL (ref 13.5–17.5)
IMMATURE GRANULOCYTES/100 LEUKOCYTES IN BLOOD BY AUTOMATED COUNT: 0.5 % (ref 0–0.9)
LEUKOCYTES (10*3/UL) IN BLOOD BY AUTOMATED COUNT: 10.1 X10E9/L (ref 4.4–11.3)
LYMPHOCYTES (10*3/UL) IN BLOOD BY AUTOMATED COUNT: 1.81 X10E9/L (ref 1.2–4.8)
LYMPHOCYTES/100 LEUKOCYTES IN BLOOD BY AUTOMATED COUNT: 17.9 % (ref 13–44)
MONOCYTES (10*3/UL) IN BLOOD BY AUTOMATED COUNT: 0.86 X10E9/L (ref 0.1–1)
MONOCYTES/100 LEUKOCYTES IN BLOOD BY AUTOMATED COUNT: 8.5 % (ref 2–10)
NEUTROPHILS (10*3/UL) IN BLOOD BY AUTOMATED COUNT: 7.36 X10E9/L (ref 1.2–7.7)
NEUTROPHILS/100 LEUKOCYTES IN BLOOD BY AUTOMATED COUNT: 73 % (ref 40–80)
NRBC (PER 100 WBCS) BY AUTOMATED COUNT: 0 /100 WBC (ref 0–0)
PLATELETS (10*3/UL) IN BLOOD AUTOMATED COUNT: 465 X10E9/L (ref 150–450)
POTASSIUM (MMOL/L) IN SER/PLAS: 3.8 MMOL/L (ref 3.5–5.3)
PROTEIN TOTAL: 9.1 G/DL (ref 6.4–8.2)
RBC, URINE: <1 /HPF (ref 0–5)
SEDIMENTATION RATE, ERYTHROCYTE: 17 MM/H (ref 0–15)
SODIUM (MMOL/L) IN SER/PLAS: 139 MMOL/L (ref 136–145)
UREA NITROGEN (MG/DL) IN SER/PLAS: 16 MG/DL (ref 6–23)
WBC, URINE: NORMAL /HPF (ref 0–5)

## 2023-07-24 NOTE — PROGRESS NOTES
Discharged from [facility] 7/22/23 on highdose steroid and Aspirin.  Bone marrow biopsy negative for malignancy.  Patient states steroids are helping and that he has appointment with rheumatology 7/25.  Patient states Mia Peck is assisting with Infectious Disease and cardiology appointment.  Will continue to monitor for 30 day transition period and outreach if issues arise.'    Aury FRANCOIS RN CCM  RN Care Coordinator  [facility]  Phone [phone]

## 2023-08-11 LAB
WEST NILE IGG ANTIBODY: NORMAL
WEST NILE IGM ANTIBODY: NORMAL

## 2023-08-28 LAB
ALANINE AMINOTRANSFERASE (SGPT) (U/L) IN SER/PLAS: 22 U/L (ref 10–52)
ALBUMIN (G/DL) IN SER/PLAS: 4.2 G/DL (ref 3.4–5)
ALKALINE PHOSPHATASE (U/L) IN SER/PLAS: 98 U/L (ref 33–120)
ANION GAP IN SER/PLAS: 13 MMOL/L (ref 10–20)
APPEARANCE, URINE: CLEAR
ASPARTATE AMINOTRANSFERASE (SGOT) (U/L) IN SER/PLAS: 17 U/L (ref 9–39)
BASOPHILS (10*3/UL) IN BLOOD BY AUTOMATED COUNT: 0.01 X10E9/L (ref 0–0.1)
BASOPHILS/100 LEUKOCYTES IN BLOOD BY AUTOMATED COUNT: 0.2 % (ref 0–2)
BILIRUBIN TOTAL (MG/DL) IN SER/PLAS: 0.6 MG/DL (ref 0–1.2)
BILIRUBIN, URINE: NEGATIVE
BLOOD, URINE: NEGATIVE
C REACTIVE PROTEIN (MG/L) IN SER/PLAS: <0.1 MG/DL
CALCIUM (MG/DL) IN SER/PLAS: 9.8 MG/DL (ref 8.6–10.6)
CARBON DIOXIDE, TOTAL (MMOL/L) IN SER/PLAS: 29 MMOL/L (ref 21–32)
CHLORIDE (MMOL/L) IN SER/PLAS: 102 MMOL/L (ref 98–107)
COLOR, URINE: YELLOW
CREATININE (MG/DL) IN SER/PLAS: 0.89 MG/DL (ref 0.5–1.3)
EOSINOPHILS (10*3/UL) IN BLOOD BY AUTOMATED COUNT: 0.03 X10E9/L (ref 0–0.7)
EOSINOPHILS/100 LEUKOCYTES IN BLOOD BY AUTOMATED COUNT: 0.6 % (ref 0–6)
ERYTHROCYTE DISTRIBUTION WIDTH (RATIO) BY AUTOMATED COUNT: 14.6 % (ref 11.5–14.5)
ERYTHROCYTE MEAN CORPUSCULAR HEMOGLOBIN CONCENTRATION (G/DL) BY AUTOMATED: 32.3 G/DL (ref 32–36)
ERYTHROCYTE MEAN CORPUSCULAR VOLUME (FL) BY AUTOMATED COUNT: 96 FL (ref 80–100)
ERYTHROCYTES (10*6/UL) IN BLOOD BY AUTOMATED COUNT: 4.82 X10E12/L (ref 4.5–5.9)
GFR MALE: >90 ML/MIN/1.73M2
GLUCOSE (MG/DL) IN SER/PLAS: 127 MG/DL (ref 74–99)
GLUCOSE, URINE: NEGATIVE MG/DL
HEMATOCRIT (%) IN BLOOD BY AUTOMATED COUNT: 46.5 % (ref 41–52)
HEMOGLOBIN (G/DL) IN BLOOD: 15 G/DL (ref 13.5–17.5)
IMMATURE GRANULOCYTES/100 LEUKOCYTES IN BLOOD BY AUTOMATED COUNT: 0.8 % (ref 0–0.9)
KETONES, URINE: NEGATIVE MG/DL
LEUKOCYTE ESTERASE, URINE: NEGATIVE
LEUKOCYTES (10*3/UL) IN BLOOD BY AUTOMATED COUNT: 5.3 X10E9/L (ref 4.4–11.3)
LYMPHOCYTES (10*3/UL) IN BLOOD BY AUTOMATED COUNT: 1.48 X10E9/L (ref 1.2–4.8)
LYMPHOCYTES/100 LEUKOCYTES IN BLOOD BY AUTOMATED COUNT: 27.8 % (ref 13–44)
MONOCYTES (10*3/UL) IN BLOOD BY AUTOMATED COUNT: 0.32 X10E9/L (ref 0.1–1)
MONOCYTES/100 LEUKOCYTES IN BLOOD BY AUTOMATED COUNT: 6 % (ref 2–10)
NEUTROPHILS (10*3/UL) IN BLOOD BY AUTOMATED COUNT: 3.45 X10E9/L (ref 1.2–7.7)
NEUTROPHILS/100 LEUKOCYTES IN BLOOD BY AUTOMATED COUNT: 64.6 % (ref 40–80)
NITRITE, URINE: NEGATIVE
NRBC (PER 100 WBCS) BY AUTOMATED COUNT: 0 /100 WBC (ref 0–0)
PH, URINE: 6 (ref 5–8)
PLATELETS (10*3/UL) IN BLOOD AUTOMATED COUNT: 297 X10E9/L (ref 150–450)
POTASSIUM (MMOL/L) IN SER/PLAS: 4.3 MMOL/L (ref 3.5–5.3)
PROTEIN TOTAL: 7 G/DL (ref 6.4–8.2)
PROTEIN, URINE: NEGATIVE MG/DL
SEDIMENTATION RATE, ERYTHROCYTE: 7 MM/H (ref 0–15)
SODIUM (MMOL/L) IN SER/PLAS: 140 MMOL/L (ref 136–145)
SPECIFIC GRAVITY, URINE: 1.02 (ref 1–1.03)
UREA NITROGEN (MG/DL) IN SER/PLAS: 14 MG/DL (ref 6–23)
UROBILINOGEN, URINE: <2 MG/DL (ref 0–1.9)

## 2023-09-06 ENCOUNTER — OFFICE VISIT (OUTPATIENT)
Dept: PEDIATRICS | Facility: CLINIC | Age: 18
End: 2023-09-06
Payer: COMMERCIAL

## 2023-09-06 VITALS
HEART RATE: 69 BPM | WEIGHT: 136.19 LBS | SYSTOLIC BLOOD PRESSURE: 127 MMHG | HEIGHT: 66 IN | DIASTOLIC BLOOD PRESSURE: 82 MMHG | BODY MASS INDEX: 21.89 KG/M2

## 2023-09-06 DIAGNOSIS — Z23 ENCOUNTER FOR IMMUNIZATION: ICD-10-CM

## 2023-09-06 DIAGNOSIS — Z00.00 WELLNESS EXAMINATION: Primary | ICD-10-CM

## 2023-09-06 PROBLEM — I77.89 CORONARY ARTERY ECTASIA (CMS-HCC): Status: ACTIVE | Noted: 2023-09-06

## 2023-09-06 PROBLEM — M30.3: Status: ACTIVE | Noted: 2023-09-06

## 2023-09-06 PROBLEM — D72.10 EOSINOPHILIA: Status: ACTIVE | Noted: 2023-09-06

## 2023-09-06 PROBLEM — I77.89: Status: ACTIVE | Noted: 2023-09-06

## 2023-09-06 PROBLEM — M25.50 ARTHRALGIA OF MULTIPLE JOINTS: Status: ACTIVE | Noted: 2023-09-06

## 2023-09-06 PROCEDURE — 90686 IIV4 VACC NO PRSV 0.5 ML IM: CPT | Performed by: PEDIATRICS

## 2023-09-06 PROCEDURE — 99395 PREV VISIT EST AGE 18-39: CPT | Performed by: PEDIATRICS

## 2023-09-06 PROCEDURE — 90471 IMMUNIZATION ADMIN: CPT | Performed by: PEDIATRICS

## 2023-09-06 PROCEDURE — 96127 BRIEF EMOTIONAL/BEHAV ASSMT: CPT | Performed by: PEDIATRICS

## 2023-09-06 RX ORDER — ASPIRIN 81 MG/1
81 TABLET ORAL DAILY
COMMUNITY
Start: 2023-07-22

## 2023-09-06 NOTE — PROGRESS NOTES
Subjective     Ángel Q Outcalt is here for his annual well visit.    Current Issues:  Questions or concerns:  either none, or only commonly asked age-specific questions.  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  He continues on a baby Aspirin daily.  He is following with adult cardiology.     Nutrition, Elimination, and Sleep:  Nutrition:  well-balanced diet, takes foods from each food group  Elimination:  normal frequency and quality of stool  Sleep:  normal for age    Social:  Peer relations:  no concerns  Family relations:  no concerns  School performance:  no concerns.  Sophomore at [employer].  Studying the[employer]ee and political science.  Teen questionnaire:  reviewed.  In a monogamous relationship with another man from college.      Confidential Adolescent Questionnaire Reviewed and Discussed      Objective   Growth chart reviewed.  General:  Well-appearing  Well-hydrated  No acute distress   Head:  Normocephalic   Eyes:  Lids and conjunctivae normal  Sclerae white  Pupils equal and reactive   ENT:  Ears:  TMs normal bilaterally  Mouth:  mucosa moist; no visible lesions  Throat:  OP moist and clear; uvula midline  Neck:  supple; no thyroid enlargement   Respiratory:  Respiratory rate:  normal  Air exchange:  normal   Adventitious breath sounds:  none  Accessory muscle use:  none   Heart:  Rate and rhythm:  regular  Murmur:  none    Abdomen:  Palpation:  soft, non-tender, non-distended, no masses  Organs:  no HSM  Bowel sounds:  normal   :  Normal external genitalia   MSK: Range of motion:  grossly normal in all joints  Swelling:  none  Muscle bulk and strength:  grossly normal   Skin:  Warm and well-perfused  No rashes   Lymphatic: No nodes larger than 1 cm palpated  No firm or fixed nodes palpated   Neuro:  Alert  Moves all extremities spontaneously  CN:  grossly intact  Tone:  normal      Assessment/Plan     - Anticipatory guidance regarding development, safety,  nutrition, physical activity, and sleep reviewed.  - Growth:  appropriate for age  - Development:  active and social   - Social:  Appropriate for age.  Confidential questionnaire reviewed and discussed. Age appropriate anticipatory guidance given.  - Vaccines:  as documented  - Return in 1 year for annual well exam or sooner if concerns arise.  -Max will see cardiology this winter.

## 2023-12-26 ENCOUNTER — TELEPHONE (OUTPATIENT)
Dept: PEDIATRICS | Facility: CLINIC | Age: 18
End: 2023-12-26
Payer: COMMERCIAL

## 2023-12-27 ENCOUNTER — OFFICE VISIT (OUTPATIENT)
Dept: PEDIATRICS | Facility: CLINIC | Age: 18
End: 2023-12-27
Payer: COMMERCIAL

## 2023-12-27 VITALS — WEIGHT: 137.44 LBS | TEMPERATURE: 97.2 F | BODY MASS INDEX: 22.18 KG/M2

## 2023-12-27 DIAGNOSIS — R05.2 SUBACUTE COUGH: Primary | ICD-10-CM

## 2023-12-27 PROCEDURE — 99213 OFFICE O/P EST LOW 20 MIN: CPT | Performed by: PEDIATRICS

## 2023-12-27 RX ORDER — AZITHROMYCIN 500 MG/1
500 TABLET, FILM COATED ORAL DAILY
Qty: 3 TABLET | Refills: 0 | Status: SHIPPED | OUTPATIENT
Start: 2023-12-27 | End: 2023-12-30

## 2023-12-27 ASSESSMENT — ENCOUNTER SYMPTOMS: COUGH: 1

## 2023-12-27 NOTE — PROGRESS NOTES
Ángel HINTON [facility] is a 18 y.o. who presents for Cough.      Cough      Cough for the past 3 weeks.  No fever, no difficulty breathing.  Mild nasal congestion recently.  No other symptoms.  He is currently on Cefdinir prescribed by Dr. Casiano.     Objective   Temp 36.2 °C (97.2 °F)   Wt 62.3 kg (137 lb 7 oz)   BMI 22.18 kg/m²     Physical Exam  Constitutional:       Appearance: Normal appearance.   HENT:      Right Ear: Tympanic membrane normal.      Left Ear: Tympanic membrane normal.      Nose: Nose normal.      Mouth/Throat:      Mouth: Mucous membranes are moist.   Eyes:      Conjunctiva/sclera: Conjunctivae normal.   Cardiovascular:      Rate and Rhythm: Normal rate and regular rhythm.      Heart sounds: Normal heart sounds.   Pulmonary:      Effort: Pulmonary effort is normal.      Breath sounds: Normal breath sounds.   Abdominal:      General: Bowel sounds are normal.      Tenderness: There is no abdominal tenderness.   Musculoskeletal:      Cervical back: Normal range of motion and neck supple.   Neurological:      Mental Status: He is alert.         Assessment/Plan   Ángel has a cough for several weeks with a clear lung exam.  His Cefdinir was stopped and Zithromax was started to cover atypical bacteria currently in the community.  He will call me if he worsens, but otherwise consider Prednisone if no better within 5 days.

## 2024-01-21 NOTE — PROGRESS NOTES
PREFERRED CONTACT INFORMATION  Telephone: [phone]   Email: [email or web address]     HISTORY OF PRESENT ILLNESS  Mr. Ángel Barkley is a 18 y.o. male with PMH of recurrent infections, arthralgia, myalgia, and elevated inflammatory markers,  who presents today for a virtual follow-up visit.     Interim history  - After initial visit with me on 7/10/2023 Ángel had worsening of his joint symptoms and was admitted with a subsequent diagnosis of atypical Kawasaki disease, that he was treated for, including with high dose IVIG. His immune labs obtained at initial visit overall did not point to an immune defect, with non-specific signs of inflammation. Normal total complement, immunoglobulin quantities (IgG, IgA, IgM, and IgE), with positive tetanus and measles titers. Pneumococcal serotypes 7/23, not unusual for Ángel's age and vaccination status. Normal lymphocyte subsets (CD3, CD4, CD8, NK, and B cells), with increase in gamma delta double negative T cells (alpha beta normal at 1.7%). B cell phenotyping with normal switched memory B cells and T cell phenotyping with normal naive CD4 T cells, with borderline elevation of T regs at 9.5% (normal 3.1-9.4%). Negative CMV IgM, negative EBV in WB and plasma, repeat negative Lympe ab, Bartonella ab, and Parvovirus IgM and IgG. Negative toxoplasma IgM and IgM, negative parasitic work-up additionally to strongyloides, toxocara, and trichinella. Normal IL1b, with mild IL6 and TNF-alpha elevation, both also non-specific. At the time discussed Ángel would benefit, long term, from Pneumovax-23 vaccine and re-checking titers 4-6 weeks later (would plan to recheck CBC w/ diff, and immunodeficiency profile at the time as well), but as he received high dose IVIG inpatient we planned to wait least 5-6 months post infusion before recovery of his baseline titers not influenced by IVIG titers.  - Since initial visit and admission he has been doing much better, no arthralgias/myalgias since the  Summer and his Cardiology follow-up was re-assuring. Recently had an URI, but no other frequent episodes of infection or other symptoms.    History  - 17 year old male with PMH of recurrent infections. Recently admitted in the end of 6/2023 due to headache, neck pain, and generalized myalgias and arthralgias, diarrhea (that self-improved) and 5 days of fever after returning from a trip to [facility]. Extensive viral testing was done and negative. Seen by ID, GI (that recommended fecal calprotectin), and Heme-Onc (assessing low likelihood for malignancy), recommending repeat CBC w/ diff 4-6 weeks after acute illness. Seen by Rheumatology (Dr. Villa) outpatient on 7/6, with multiple autoimmune labs ordered, including for JAYDE, SLE, and sarcoidosis. Previously in [facility] was ok, had URI, other people as well. Before flying to [facility] was already having some URI symptoms, also with neck pain and headache, some chills as well.   - Had HFMD in the Fall of 2021, severe sore throat and ulcers, went to the ED, lasted a week, high fevers. In Spring 2022 went to [place] had abdominal pain, had to go to the ED, symptoms lasted a few days. Gets sick very easily. Got Salmonella from peanut butter when 1 y.o., had to go to the ED. Used to have epilepsy when younger, off meds for several years and seizure-free. Had SARS-CoV-2 in 2021/2022.    History of infections   Sinusitis: a few episodes   Bronchitis: dry cough for 3 months every year, usually in the Winter, in the context of URIs   Pneumonia: no   Otitis: swimmer's ear   Skin and Soft Tissue: warts when younger, has had impetigo as well   Gastrointestinal: except for episodes above no blood in the stool, no diarrhea   Prior Hospitalizations: as above    Ig at the time of diagnosis: IgG 1590 mg/dl, IgA 171 mg/dl, IgM 213 mg/dl, IgE 12 kU/L    7/20  - BM biopsy - no immunophenotypic evidence of a lymphoproliferative disorder, no increased blast population, no immunophenotypic evidence  of plasma cell neoplasm    7/10  - CH50 - normal  - IgG 1590, IgA 171, IgM 213  - IgE 12  - Tetanus - positive  - Measles - positive  - CMV IgM - negative  - Toxoplasma IgM and IgG - negative  - Strongyloides IgG - negative  - Toxocara IgG - negative  - Trichinella ab - negative  - Pneumococcal serotypes 7/23  - EBV WB - negative  - EBV plasma - negative  - Lyme ab with reflex - negative  - Bartonella ab - negative  - Parvovirus IgM and IgG - negative  - IL1b - <6.5  - IL6 - 11.1 mild elevation  - TNF-alpha 16.3 mild elevation  - CD3 1288, CD4 701, CD8 375, , B 212, elevated DNT cells 13%, due to gamma delta, alpha beta 1.7%  - B cell phenotyping - normal switched memory B cells 11.6%  - T cell phenotyping - normal naive CD4 T cells, borderline elevated T regs 9.5% (3.1-9.4)    Inpatient acute illness  6/30  - CBC w/ diff - WBC 11.0, Hb 12.0 mildly low, Plt 334, ANC 7720 borderline elevated, ALC 1120 mildly reduced, AEC 1120 elevated  - CRP - 19.46 elevated, mild improvement  - Lyme Ab - negative  - Brucella IgG and IgM - negative    6/29  - Occult blood - negative  - Stool O+P - negative  - Alpha-1-antitrypsin stool - normal  - Fecal calprotectin - normal  - Stool pathogen PCR - negative    6/28  - SARS CoV 2 PCR - negative  - Adenovirus NP - negative  - HIV RNA PCR - negative  - RF - normal  -   - Ferritin 409 mild elevation  - Syphilis screening - negative  - CMV IgG - negative  - EBV panel - negative    6/27  - CRP - 23.33 elevated    6/25  - CBC w/ diff - WBC 12.8 elevated, Hb 15.0, Plt 246, ANC 3540,  low,       Immunizations  Uptodate? Yes, well tolerated    Labs/exams    Past Immunologic History  Gene mutation identified: no    Immunoglobulin Therapy  No  Prophylactic antibiotics: No    BIRTH HISTORY  Birth weight: 6lb8oz, 38 weeker   Normal delivery? Yes  Where was the infant born?: [facility]    FAMILY HISTORY  On paternal side cousin has Crohn's disease.   Family history of CIU on  brother, and CIU vs CD.    SOCIAL HISTORY  Home: Lives in a dorm in college  Smokers: None  Pets: Dog at home, no pet exposure  School: Studying Political Science and Theater at [employer]    ALLERGIES  Allergies   Allergen Reactions    Other Unknown     MEDICATIONS  Current Outpatient Medications on File Prior to Visit   Medication Sig Dispense Refill    aspirin 81 mg EC tablet Take 1 tablet (81 mg) by mouth once daily.       No current facility-administered medications on file prior to visit.     REVIEW OF SYSTEMS  Pertinent positives and negatives have been assessed in the HPI. All other systems have been reviewed and are negative except as noted in the HPI.    PHYSICAL EXAMINATION   There were no vitals taken for this visit.    Constitutional: no acute distress and well developed  Ears/Nose/Mouth/Throat: oropharynx pink and moist, anicteric bilaterally  Respiratory: normal respiratory effort  Neuro: awake, alert, answers appropriately    ASSESSMENT & PLAN  Mr. Ángel Barkley is a 18 y.o. male with PMH of recurrent infections, arthralgia, myalgia, and elevated inflammatory markers,  who presents today for a virtual follow-up visit.     1. Atypical Kawasaki disease / Elevated inflammatory markers / Arthralgia / Myalgia  Ángel had a complex initial presentation, with a baseline of recurrent URIs growing up and a few instances, especially in the last two years where he had intense symptoms in the setting of an acute infection, as previously happened with HFMD and with Salmonella. Also with history of some skin infections like warts or impetigo, though not recurrent. In the Spring/Summer of 2023 he developed generalized arthralgia and myalgia, that lead to a comprehensive infectious rule out and admission for work-up, with progression of joint and muscle pain, then wheelchair bound, and ultimately was diagnosed with atypical Kawasaki disease, treated with IVIG and steroids, which fully improved his symptoms and doing  very well since then. At the time, as part of his work-up we performed an immune work-up that did not point to an immune defect, with non-specific signs of inflammation. Normal total complement, immunoglobulin quantities (IgG, IgA, IgM, and IgE), with positive tetanus and measles titers. Pneumococcal serotypes 7/23, not unusual for Ángel's age and vaccination status. Normal lymphocyte subsets (CD3, CD4, CD8, NK, and B cells), with increase in gamma delta double negative T cells (alpha beta normal at 1.7%). B cell phenotyping with normal switched memory B cells and T cell phenotyping with normal naive CD4 T cells. Negative CMV IgM, negative EBV in WB and plasma, repeat negative Lyme ab, Bartonella ab, and Parvovirus IgM and IgG. Negative toxoplasma IgM and IgM, negative parasitic work-up additionally to strongyloides, toxocara, and trichinella. Normal IL1b, with mild IL6 and TNF-alpha elevation, both also non-specific. At the time discussed Ángel would benefit, long term, from Pneumovax-23 vaccine and re-checking titers 4-6 weeks later (would plan to recheck CBC w/ diff, and immunodeficiency profile at the time as well), but as he received high dose IVIG inpatient we planned to wait least 5-6 months post infusion before recovery of his baseline titers not influenced by IVIG titers, which falls around the current date.  - Recommend that Ángel receives a Pneumovax-23 vaccine at his PCP or with us, and patient should please let us know when he receives it so we can plan to repeat pneumococcal serotypes 4 to 6 weeks later. At the time will also plan to recheck CBC w/ diff, and immunodeficiency profile.    Alexander Holder MD     This visit was completed via audio and visual technology. All issues as below were discussed and addressed and a limited physical exam within the constraints of the technology was performed. If it was felt that the patient should be evaluated in clinic then they were directed there. Verbal  consent was requested and obtained from patient to provide this telehealth service on this date for a telehealth visit.

## 2024-01-22 ENCOUNTER — TELEMEDICINE (OUTPATIENT)
Dept: ALLERGY | Facility: CLINIC | Age: 19
End: 2024-01-22
Payer: COMMERCIAL

## 2024-01-22 DIAGNOSIS — M25.50 ARTHRALGIA OF MULTIPLE JOINTS: ICD-10-CM

## 2024-01-22 DIAGNOSIS — B99.9 RECURRENT INFECTIONS: ICD-10-CM

## 2024-01-22 DIAGNOSIS — M30.3 ATYPICAL KAWASAKI DISEASE (MULTI): Primary | ICD-10-CM

## 2024-01-22 PROCEDURE — 99213 OFFICE O/P EST LOW 20 MIN: CPT | Mod: 95 | Performed by: STUDENT IN AN ORGANIZED HEALTH CARE EDUCATION/TRAINING PROGRAM

## 2024-01-22 PROCEDURE — 99213 OFFICE O/P EST LOW 20 MIN: CPT | Performed by: STUDENT IN AN ORGANIZED HEALTH CARE EDUCATION/TRAINING PROGRAM

## 2024-01-22 NOTE — PATIENT INSTRUCTIONS
Thank you very much for visiting us today and allowing us to care of your health concerns, Max. As we discussed today, we recommend that you receive a Pneumovax-23 vaccine at your PCP or with us (either at our main campus location, but also possible at our [place] clinic or I may start in [facility] around March, just let us know!). If you receive it at your PCP, please let us know when you do so we can plan to repeat pneumococcal serotypes 4 to 6 weeks later. At that time we may also send/repeat additional labs, as/if needed. Please feel free to contact us through our office at [phone] and press 0 to talk with our  for any scheduling needs or [phone] to talk with our nursing team if you have any earlier or additional clinical needs. It was a pleasure caring for you today!    ==============================

## 2024-08-28 DIAGNOSIS — Z71.84 TRAVEL ADVICE ENCOUNTER: Primary | ICD-10-CM

## 2024-08-28 RX ORDER — ATOVAQUONE AND PROGUANIL HYDROCHLORIDE 250; 100 MG/1; MG/1
1 TABLET, FILM COATED ORAL DAILY
Qty: 22 TABLET | Refills: 0 | Status: SHIPPED | OUTPATIENT
Start: 2024-08-28 | End: 2024-08-30 | Stop reason: ALTCHOICE

## 2024-08-28 RX ORDER — AZITHROMYCIN 500 MG/1
500 TABLET, FILM COATED ORAL DAILY
COMMUNITY
End: 2024-08-28 | Stop reason: SDUPTHER

## 2024-08-28 RX ORDER — AZITHROMYCIN 500 MG/1
500 TABLET, FILM COATED ORAL DAILY
Qty: 2 TABLET | Refills: 0 | Status: SHIPPED | OUTPATIENT
Start: 2024-08-28 | End: 2024-08-30 | Stop reason: ALTCHOICE

## 2024-08-28 RX ORDER — ATOVAQUONE AND PROGUANIL HYDROCHLORIDE 250; 100 MG/1; MG/1
1 TABLET, FILM COATED ORAL DAILY
COMMUNITY
End: 2024-08-28 | Stop reason: SDUPTHER

## 2024-08-30 ENCOUNTER — APPOINTMENT (OUTPATIENT)
Dept: PEDIATRICS | Facility: CLINIC | Age: 19
End: 2024-08-30
Payer: COMMERCIAL

## 2024-08-30 ENCOUNTER — LAB (OUTPATIENT)
Dept: LAB | Facility: LAB | Age: 19
End: 2024-08-30
Payer: COMMERCIAL

## 2024-08-30 VITALS
WEIGHT: 146 LBS | HEIGHT: 67 IN | SYSTOLIC BLOOD PRESSURE: 112 MMHG | HEART RATE: 85 BPM | BODY MASS INDEX: 22.91 KG/M2 | DIASTOLIC BLOOD PRESSURE: 71 MMHG

## 2024-08-30 DIAGNOSIS — Z00.00 WELLNESS EXAMINATION: Primary | ICD-10-CM

## 2024-08-30 DIAGNOSIS — Z11.4 SCREENING FOR HIV (HUMAN IMMUNODEFICIENCY VIRUS): ICD-10-CM

## 2024-08-30 DIAGNOSIS — Z23 ENCOUNTER FOR IMMUNIZATION: ICD-10-CM

## 2024-08-30 LAB — HIV 1+2 AB+HIV1 P24 AG SERPL QL IA: NONREACTIVE

## 2024-08-30 PROCEDURE — 96127 BRIEF EMOTIONAL/BEHAV ASSMT: CPT | Performed by: PEDIATRICS

## 2024-08-30 PROCEDURE — 90715 TDAP VACCINE 7 YRS/> IM: CPT | Performed by: PEDIATRICS

## 2024-08-30 PROCEDURE — 99395 PREV VISIT EST AGE 18-39: CPT | Performed by: PEDIATRICS

## 2024-08-30 PROCEDURE — 90471 IMMUNIZATION ADMIN: CPT | Performed by: PEDIATRICS

## 2024-08-30 PROCEDURE — 87389 HIV-1 AG W/HIV-1&-2 AB AG IA: CPT

## 2024-08-30 PROCEDURE — 36415 COLL VENOUS BLD VENIPUNCTURE: CPT

## 2024-08-30 PROCEDURE — 3008F BODY MASS INDEX DOCD: CPT | Performed by: PEDIATRICS

## 2024-08-30 RX ORDER — HYDROXYZINE PAMOATE 25 MG/1
25 CAPSULE ORAL 3 TIMES DAILY PRN
COMMUNITY
Start: 2024-02-14

## 2024-08-30 RX ORDER — ARIPIPRAZOLE 5 MG/1
5 TABLET ORAL
COMMUNITY
Start: 2024-08-22

## 2024-08-30 RX ORDER — ESCITALOPRAM OXALATE 20 MG/1
1 TABLET ORAL
COMMUNITY
Start: 2024-08-23

## 2024-08-30 ASSESSMENT — PATIENT HEALTH QUESTIONNAIRE - PHQ9
10. IF YOU CHECKED OFF ANY PROBLEMS, HOW DIFFICULT HAVE THESE PROBLEMS MADE IT FOR YOU TO DO YOUR WORK, TAKE CARE OF THINGS AT HOME, OR GET ALONG WITH OTHER PEOPLE: VERY DIFFICULT
4. FEELING TIRED OR HAVING LITTLE ENERGY: NEARLY EVERY DAY
6. FEELING BAD ABOUT YOURSELF - OR THAT YOU ARE A FAILURE OR HAVE LET YOURSELF OR YOUR FAMILY DOWN: MORE THAN HALF THE DAYS
4. FEELING TIRED OR HAVING LITTLE ENERGY: NEARLY EVERY DAY
6. FEELING BAD ABOUT YOURSELF - OR THAT YOU ARE A FAILURE OR HAVE LET YOURSELF OR YOUR FAMILY DOWN: MORE THAN HALF THE DAYS
2. FEELING DOWN, DEPRESSED OR HOPELESS: MORE THAN HALF THE DAYS
1. LITTLE INTEREST OR PLEASURE IN DOING THINGS: NEARLY EVERY DAY
SUM OF ALL RESPONSES TO PHQ9 QUESTIONS 1 & 2: 5
SUM OF ALL RESPONSES TO PHQ QUESTIONS 1-9: 18
8. MOVING OR SPEAKING SO SLOWLY THAT OTHER PEOPLE COULD HAVE NOTICED. OR THE OPPOSITE, BEING SO FIGETY OR RESTLESS THAT YOU HAVE BEEN MOVING AROUND A LOT MORE THAN USUAL: MORE THAN HALF THE DAYS
3. TROUBLE FALLING OR STAYING ASLEEP OR SLEEPING TOO MUCH: SEVERAL DAYS
3. TROUBLE FALLING OR STAYING ASLEEP: SEVERAL DAYS
1. LITTLE INTEREST OR PLEASURE IN DOING THINGS: NEARLY EVERY DAY
2. FEELING DOWN, DEPRESSED OR HOPELESS: MORE THAN HALF THE DAYS
5. POOR APPETITE OR OVEREATING: SEVERAL DAYS
5. POOR APPETITE OR OVEREATING: SEVERAL DAYS
9. THOUGHTS THAT YOU WOULD BE BETTER OFF DEAD, OR OF HURTING YOURSELF: SEVERAL DAYS
10. IF YOU CHECKED OFF ANY PROBLEMS, HOW DIFFICULT HAVE THESE PROBLEMS MADE IT FOR YOU TO DO YOUR WORK, TAKE CARE OF THINGS AT HOME, OR GET ALONG WITH OTHER PEOPLE: VERY DIFFICULT
9. THOUGHTS THAT YOU WOULD BE BETTER OFF DEAD, OR OF HURTING YOURSELF: SEVERAL DAYS
7. TROUBLE CONCENTRATING ON THINGS, SUCH AS READING THE NEWSPAPER OR WATCHING TELEVISION: NEARLY EVERY DAY
7. TROUBLE CONCENTRATING ON THINGS, SUCH AS READING THE NEWSPAPER OR WATCHING TELEVISION: NEARLY EVERY DAY
8. MOVING OR SPEAKING SO SLOWLY THAT OTHER PEOPLE COULD HAVE NOTICED. OR THE OPPOSITE - BEING SO FIDGETY OR RESTLESS THAT YOU HAVE BEEN MOVING AROUND A LOT MORE THAN USUAL: MORE THAN HALF THE DAYS

## 2024-08-30 NOTE — PROGRESS NOTES
Subjective     Ángel Barkley is here for his annual well visit.    Current Issues:  Questions or concerns:  Ángel has been seeing Dr. Keny Phillips for psychiatry for the last 8 months for depression.  He is seeing Addison Skinner for therapy as well.  He is now treated with Escitalopram and Abilify.  Abilify was recently increased due to feeling listless.  His PHQ 9 was scored today and was high risk, although he states that he has had thoughts of self harm, but not lethal self-harm.  He has no plan for hurting himself currently.     Nutrition, Elimination, and Sleep:  Nutrition:  well-balanced diet, takes foods from each food group  Elimination:  normal frequency and quality of stool  Sleep:  normal for age    Social:  Peer relations:  no concerns  Family relations:  no concerns  School performance:  no concerns, yesenia at [employer] this fall.    Teen questionnaire:  reviewed      Confidential Adolescent Questionnaire Reviewed and Discussed      Objective   Growth chart reviewed.  General:  Well-appearing  Well-hydrated  No acute distress   Head:  Normocephalic   Eyes:  Lids and conjunctivae normal  Sclerae white  Pupils equal and reactive   ENT:  Ears:  TMs normal bilaterally  Mouth:  mucosa moist; no visible lesions  Throat:  OP moist and clear; uvula midline  Neck:  supple; no thyroid enlargement   Respiratory:  Respiratory rate:  normal  Air exchange:  normal   Adventitious breath sounds:  none  Accessory muscle use:  none   Heart:  Rate and rhythm:  regular  Murmur:  none    Abdomen:  Palpation:  soft, non-tender, non-distended, no masses  Organs:  no HSM  Bowel sounds:  normal   :  Normal external genitalia   MSK: Range of motion:  grossly normal in all joints  Swelling:  none  Muscle bulk and strength:  grossly normal   Skin:  Warm and well-perfused  No rashes   Lymphatic: No nodes larger than 1 cm palpated  No firm or fixed nodes palpated   Neuro:  Alert  Moves all extremities spontaneously  CN:  grossly  intact  Tone:  normal      Assessment/Plan     - Anticipatory guidance regarding development, safety, nutrition, physical activity, and sleep reviewed.  - Growth:  appropriate for age  - Development:  active and social   - Social:  Appropriate for age.  Confidential questionnaire reviewed and discussed. Age appropriate anticipatory guidance given.  - Vaccines:  as documented  - Return in 1 year for annual well exam or sooner if concerns arise.  -Ángel will continue follow up with psychiatry and psychology to manage depression.  Discussed safety plan.  -Ángel will call cardiology today to see if he can be seen before leaving for [place] next week -- if not, he will schedule for when he returns in 3 months.  Currently no cardiac symptoms.  Will continue baby Aspirin daily for now.  -HIV testing ordered

## 2024-09-10 DIAGNOSIS — Z71.84 TRAVEL ADVICE ENCOUNTER: Primary | ICD-10-CM

## 2024-09-10 RX ORDER — ATOVAQUONE AND PROGUANIL HYDROCHLORIDE 250; 100 MG/1; MG/1
1 TABLET, FILM COATED ORAL DAILY
Qty: 22 TABLET | Refills: 0 | Status: SHIPPED | OUTPATIENT
Start: 2024-09-10

## 2024-09-10 RX ORDER — AZITHROMYCIN 500 MG/1
500 TABLET, FILM COATED ORAL DAILY
COMMUNITY
End: 2024-09-10 | Stop reason: SDUPTHER

## 2024-09-10 RX ORDER — AZITHROMYCIN 500 MG/1
500 TABLET, FILM COATED ORAL DAILY
Qty: 2 TABLET | Refills: 0 | Status: SHIPPED | OUTPATIENT
Start: 2024-09-10

## 2024-09-10 RX ORDER — ATOVAQUONE AND PROGUANIL HYDROCHLORIDE 250; 100 MG/1; MG/1
1 TABLET, FILM COATED ORAL DAILY
COMMUNITY
End: 2024-09-10 | Stop reason: SDUPTHER

## 2024-11-07 ENCOUNTER — PATIENT MESSAGE (OUTPATIENT)
Dept: PEDIATRICS | Facility: CLINIC | Age: 19
End: 2024-11-07
Payer: COMMERCIAL

## 2024-11-07 DIAGNOSIS — I77.89: Primary | ICD-10-CM

## 2024-11-07 DIAGNOSIS — M30.3 ATYPICAL KAWASAKI DISEASE (MULTI): ICD-10-CM

## 2024-11-07 DIAGNOSIS — I77.89 CORONARY ARTERY ECTASIA (CMS-HCC): Primary | ICD-10-CM

## 2025-09-03 ENCOUNTER — OFFICE VISIT (OUTPATIENT)
Dept: PEDIATRICS | Facility: CLINIC | Age: 20
End: 2025-09-03
Payer: COMMERCIAL

## 2025-09-03 VITALS
SYSTOLIC BLOOD PRESSURE: 123 MMHG | DIASTOLIC BLOOD PRESSURE: 77 MMHG | WEIGHT: 152 LBS | HEART RATE: 78 BPM | BODY MASS INDEX: 24.43 KG/M2 | HEIGHT: 66 IN

## 2025-09-03 DIAGNOSIS — Z00.00 WELL ADULT EXAM: Primary | ICD-10-CM

## 2025-09-03 DIAGNOSIS — M30.3 ATYPICAL KAWASAKI DISEASE: ICD-10-CM

## 2025-09-03 DIAGNOSIS — R40.0 SOMNOLENCE: ICD-10-CM

## 2025-09-03 PROCEDURE — 96127 BRIEF EMOTIONAL/BEHAV ASSMT: CPT | Performed by: PEDIATRICS

## 2025-09-03 PROCEDURE — 3008F BODY MASS INDEX DOCD: CPT | Performed by: PEDIATRICS

## 2025-09-03 PROCEDURE — 99395 PREV VISIT EST AGE 18-39: CPT | Performed by: PEDIATRICS

## 2025-09-03 RX ORDER — ESCITALOPRAM OXALATE 5 MG/1
5 TABLET ORAL DAILY
COMMUNITY
Start: 2024-11-27

## 2025-09-03 RX ORDER — DOXYCYCLINE HYCLATE 100 MG
TABLET ORAL
COMMUNITY
Start: 2025-07-17

## 2025-09-03 RX ORDER — EMTRICITABINE AND TENOFOVIR ALAFENAMIDE 200; 25 MG/1; MG/1
1 TABLET ORAL
COMMUNITY
Start: 2025-07-17

## 2025-09-03 ASSESSMENT — PATIENT HEALTH QUESTIONNAIRE - PHQ9
1. LITTLE INTEREST OR PLEASURE IN DOING THINGS: SEVERAL DAYS
8. MOVING OR SPEAKING SO SLOWLY THAT OTHER PEOPLE COULD HAVE NOTICED. OR THE OPPOSITE - BEING SO FIDGETY OR RESTLESS THAT YOU HAVE BEEN MOVING AROUND A LOT MORE THAN USUAL: SEVERAL DAYS
4. FEELING TIRED OR HAVING LITTLE ENERGY: MORE THAN HALF THE DAYS
10. IF YOU CHECKED OFF ANY PROBLEMS, HOW DIFFICULT HAVE THESE PROBLEMS MADE IT FOR YOU TO DO YOUR WORK, TAKE CARE OF THINGS AT HOME, OR GET ALONG WITH OTHER PEOPLE: SOMEWHAT DIFFICULT
6. FEELING BAD ABOUT YOURSELF - OR THAT YOU ARE A FAILURE OR HAVE LET YOURSELF OR YOUR FAMILY DOWN: SEVERAL DAYS
5. POOR APPETITE OR OVEREATING: SEVERAL DAYS
7. TROUBLE CONCENTRATING ON THINGS, SUCH AS READING THE NEWSPAPER OR WATCHING TELEVISION: MORE THAN HALF THE DAYS
8. MOVING OR SPEAKING SO SLOWLY THAT OTHER PEOPLE COULD HAVE NOTICED. OR THE OPPOSITE, BEING SO FIGETY OR RESTLESS THAT YOU HAVE BEEN MOVING AROUND A LOT MORE THAN USUAL: SEVERAL DAYS
4. FEELING TIRED OR HAVING LITTLE ENERGY: MORE THAN HALF THE DAYS
9. THOUGHTS THAT YOU WOULD BE BETTER OFF DEAD, OR OF HURTING YOURSELF: SEVERAL DAYS
9. THOUGHTS THAT YOU WOULD BE BETTER OFF DEAD, OR OF HURTING YOURSELF: SEVERAL DAYS
7. TROUBLE CONCENTRATING ON THINGS, SUCH AS READING THE NEWSPAPER OR WATCHING TELEVISION: MORE THAN HALF THE DAYS
SUM OF ALL RESPONSES TO PHQ QUESTIONS 1-9: 13
3. TROUBLE FALLING OR STAYING ASLEEP: NEARLY EVERY DAY
SUM OF ALL RESPONSES TO PHQ9 QUESTIONS 1 & 2: 2
2. FEELING DOWN, DEPRESSED OR HOPELESS: SEVERAL DAYS
3. TROUBLE FALLING OR STAYING ASLEEP OR SLEEPING TOO MUCH: NEARLY EVERY DAY
1. LITTLE INTEREST OR PLEASURE IN DOING THINGS: SEVERAL DAYS
2. FEELING DOWN, DEPRESSED OR HOPELESS: SEVERAL DAYS
5. POOR APPETITE OR OVEREATING: SEVERAL DAYS
6. FEELING BAD ABOUT YOURSELF - OR THAT YOU ARE A FAILURE OR HAVE LET YOURSELF OR YOUR FAMILY DOWN: SEVERAL DAYS
10. IF YOU CHECKED OFF ANY PROBLEMS, HOW DIFFICULT HAVE THESE PROBLEMS MADE IT FOR YOU TO DO YOUR WORK, TAKE CARE OF THINGS AT HOME, OR GET ALONG WITH OTHER PEOPLE: SOMEWHAT DIFFICULT